# Patient Record
Sex: FEMALE | Race: BLACK OR AFRICAN AMERICAN | NOT HISPANIC OR LATINO | URBAN - NONMETROPOLITAN AREA
[De-identification: names, ages, dates, MRNs, and addresses within clinical notes are randomized per-mention and may not be internally consistent; named-entity substitution may affect disease eponyms.]

---

## 2019-01-02 ENCOUNTER — IMPORTED ENCOUNTER (OUTPATIENT)
Dept: URBAN - NONMETROPOLITAN AREA CLINIC 1 | Facility: CLINIC | Age: 59
End: 2019-01-02

## 2019-01-02 NOTE — PATIENT DISCUSSION
"*Excision of Lid Lesion: LLL-Excision of a lid lesion involves the surgical removal of part or all of a lesion. Risk include infection inflammation bleeding tightness around the suture and the need for more surgery. I have discussed the above procedure possible alternative methods of treatment and risk factors involved with the patient and/or family members. The patient wishes to proceed with excision of lid lesion.    -Consent signed - See MOS NotesCOAG-Appears stable. -Continue drops in plan as directed. Stressed importance of compliance. - Continue Travatan QAM OU- IOP's today were 19 OU  DM s DR-Stressed the importance of keeping blood sugars under control blood pressure under control and weight normalization and regular visits with PCP. -Explained the possible effects of poorly controlled diabetes and the damage that diabetes can cause to ocular health. -Patient to check HgbA1C.-Pt instructed to contact our office with any vision changes. DONTA-Explained DONTA and associated symptoms.-Recommend increasing Omega 3s.-Pt to begin artificial tears OU QID PRN. Pt will contact us if this does not provide relief. Consider punctal plugs in that case. - Continue Systane OU BID/+PRN. Hx Iritis quiet at this time. Continue Lotemax OU QD.""; Dr's Notes: Letter to Dr Charlie Becker. "

## 2019-01-23 PROBLEM — E11.9: Noted: 2019-01-23

## 2019-01-23 PROBLEM — H40.1132: Noted: 2019-01-23

## 2019-01-23 PROBLEM — B07.8: Noted: 2019-01-23

## 2019-01-23 PROBLEM — H04.123: Noted: 2019-01-23

## 2020-03-13 ENCOUNTER — IMPORTED ENCOUNTER (OUTPATIENT)
Dept: URBAN - NONMETROPOLITAN AREA CLINIC 1 | Facility: CLINIC | Age: 60
End: 2020-03-13

## 2020-03-13 PROBLEM — H40.1132: Noted: 2020-03-13

## 2020-03-13 PROBLEM — H04.123: Noted: 2020-03-13

## 2020-03-13 PROBLEM — E11.9: Noted: 2020-03-13

## 2020-03-13 PROCEDURE — 92012 INTRM OPH EXAM EST PATIENT: CPT

## 2020-03-13 NOTE — PATIENT DISCUSSION
COAG -Appears stable. -Continue Travatan Z qam as directed. Stressed importance of compliance. -RTC VF and OCT ON next available. 3 month Ta Check w/ JSDM s DR-Stressed the importance of keeping blood sugars under control blood pressure under control and weight normalization and regular visits with PCP. -Explained the possible effects of poorly controlled diabetes and the damage that diabetes can cause to ocular health. -Patient to check HgbA1C.-Pt instructed to contact our office with any vision changes. hx of Iritis. Quiet today. Continue Lotemax qam OU.; Dr's Notes: Letter to Dr Courtney Lamar.

## 2020-07-07 ENCOUNTER — IMPORTED ENCOUNTER (OUTPATIENT)
Dept: URBAN - NONMETROPOLITAN AREA CLINIC 1 | Facility: CLINIC | Age: 60
End: 2020-07-07

## 2020-07-07 PROCEDURE — 92133 CPTRZD OPH DX IMG PST SGM ON: CPT

## 2020-07-07 PROCEDURE — 92012 INTRM OPH EXAM EST PATIENT: CPT

## 2020-07-07 NOTE — PATIENT DISCUSSION
COAG -iop stable ou-Continue Travatan Z qam as directed. -Stressed importance of compliance. -oct today /stable ouDM s DR-Stressed the importance of keeping blood sugars under control blood pressure under control and weight normalization and regular visits with PCP. -Explained the possible effects of poorly controlled diabetes and the damage that diabetes can cause to ocular health. -Patient to check HgbA1C.-Pt instructed to contact our office with any vision changes. hx of Iritis. Quiet today. Continue Lotemax qam OU.; Dr's Notes: Letter to Dr Aissatou Patrick.

## 2020-11-03 ENCOUNTER — IMPORTED ENCOUNTER (OUTPATIENT)
Dept: URBAN - NONMETROPOLITAN AREA CLINIC 1 | Facility: CLINIC | Age: 60
End: 2020-11-03

## 2020-11-03 PROCEDURE — 92012 INTRM OPH EXAM EST PATIENT: CPT

## 2020-11-03 NOTE — PATIENT DISCUSSION
COAG -iop stable ou-Continue Travatan Z qam as directed. -Stressed importance of compliance. -monitor for iop and vf changesDM s DR-Stressed the importance of keeping blood sugars under control blood pressure under control and weight normalization and regular visits with PCP. -Explained the possible effects of poorly controlled diabetes and the damage that diabetes can cause to ocular health. -Patient to check HgbA1C.-Pt instructed to contact our office with any vision changes. hx of Iritis. Quiet today. Continue Lotemax qam OU.; Dr's Notes: Letter to Dr Alejo Alva.

## 2021-03-02 ENCOUNTER — IMPORTED ENCOUNTER (OUTPATIENT)
Dept: URBAN - NONMETROPOLITAN AREA CLINIC 1 | Facility: CLINIC | Age: 61
End: 2021-03-02

## 2021-03-02 PROCEDURE — 92014 COMPRE OPH EXAM EST PT 1/>: CPT

## 2021-03-02 PROCEDURE — 92020 GONIOSCOPY: CPT

## 2021-03-02 NOTE — PATIENT DISCUSSION
COAG -iop stable ou-Continue Travatan Z qam as directed. -Stressed importance of compliance. -monitor for iop and vf changesDM s DR-Stressed the importance of keeping blood sugars under control blood pressure under control and weight normalization and regular visits with PCP. -Explained the possible effects of poorly controlled diabetes and the damage that diabetes can cause to ocular health. -Patient to check HgbA1C.-Pt instructed to contact our office with any vision changes. hx of Iritis. Quiet today. Continue Lotemax qam OU.

## 2022-04-09 ASSESSMENT — PACHYMETRY
OD_CT_UM: 550; ADJ: THIN
OS_CT_UM: 552; ADJ: THIN
OD_CT_UM: 550; ADJ: THIN
OS_CT_UM: 552; ADJ: THIN
OD_CT_UM: 550; ADJ: THIN
OS_CT_UM: 552; ADJ: THIN
OS_CT_UM: 552; ADJ: THIN
OD_CT_UM: 550; ADJ: THIN
OS_CT_UM: 552; ADJ: THIN
OD_CT_UM: 550; ADJ: THIN

## 2022-04-09 ASSESSMENT — TONOMETRY
OD_IOP_MMHG: 17
OD_IOP_MMHG: 17
OS_IOP_MMHG: 17
OD_IOP_MMHG: 17
OS_IOP_MMHG: 17
OD_IOP_MMHG: 17

## 2022-04-09 ASSESSMENT — VISUAL ACUITY
OD_SC: 20/30
OD_SC: 20/25
OD_SC: 20/30-2
OS_SC: 20/30-1
OD_SC: 20/30
OS_SC: 20/20
OS_SC: 20/30+2
OD_SC: 20/40
OS_SC: 20/50
OS_SC: 20/25

## 2022-04-12 ENCOUNTER — ESTABLISHED PATIENT (OUTPATIENT)
Dept: RURAL CLINIC 1 | Facility: CLINIC | Age: 62
End: 2022-04-12

## 2022-04-12 DIAGNOSIS — E11.9: ICD-10-CM

## 2022-04-12 DIAGNOSIS — H40.1132: ICD-10-CM

## 2022-04-12 PROCEDURE — 92133 CPTRZD OPH DX IMG PST SGM ON: CPT

## 2022-04-12 PROCEDURE — 92014 COMPRE OPH EXAM EST PT 1/>: CPT

## 2022-04-12 ASSESSMENT — TONOMETRY
OD_IOP_MMHG: 18
OS_IOP_MMHG: 18

## 2022-04-12 ASSESSMENT — VISUAL ACUITY
OS_CC: 20/25+1
OD_CC: 20/20

## 2022-10-12 ENCOUNTER — ESTABLISHED PATIENT (OUTPATIENT)
Dept: RURAL CLINIC 1 | Facility: CLINIC | Age: 62
End: 2022-10-12

## 2022-10-12 DIAGNOSIS — H40.1131: ICD-10-CM

## 2022-10-12 DIAGNOSIS — H04.123: ICD-10-CM

## 2022-10-12 DIAGNOSIS — E11.9: ICD-10-CM

## 2022-10-12 PROCEDURE — 92083 EXTENDED VISUAL FIELD XM: CPT

## 2022-10-12 PROCEDURE — 99213 OFFICE O/P EST LOW 20 MIN: CPT

## 2022-10-12 ASSESSMENT — TONOMETRY
OD_IOP_MMHG: 17
OS_IOP_MMHG: 17

## 2022-10-12 ASSESSMENT — VISUAL ACUITY
OS_CC: 20/25-2
OD_CC: 20/20-2

## 2023-04-07 ENCOUNTER — FOLLOW UP (OUTPATIENT)
Dept: RURAL CLINIC 1 | Facility: CLINIC | Age: 63
End: 2023-04-07

## 2023-04-07 DIAGNOSIS — E11.9: ICD-10-CM

## 2023-04-07 DIAGNOSIS — H04.123: ICD-10-CM

## 2023-04-07 DIAGNOSIS — H40.1131: ICD-10-CM

## 2023-04-07 DIAGNOSIS — H25.9: ICD-10-CM

## 2023-04-07 PROCEDURE — 92133 CPTRZD OPH DX IMG PST SGM ON: CPT

## 2023-04-07 PROCEDURE — 92014 COMPRE OPH EXAM EST PT 1/>: CPT

## 2023-04-07 ASSESSMENT — VISUAL ACUITY
OS_CC: 20/25
OD_CC: 20/25

## 2023-04-07 ASSESSMENT — TONOMETRY
OD_IOP_MMHG: 18
OS_IOP_MMHG: 18

## 2024-07-17 ENCOUNTER — COMPREHENSIVE EXAM (OUTPATIENT)
Dept: RURAL CLINIC 1 | Facility: CLINIC | Age: 64
End: 2024-07-17

## 2024-07-17 DIAGNOSIS — H40.1131: ICD-10-CM

## 2024-07-17 DIAGNOSIS — E11.9: ICD-10-CM

## 2024-07-17 PROCEDURE — 92014 COMPRE OPH EXAM EST PT 1/>: CPT

## 2024-07-17 PROCEDURE — 92083 EXTENDED VISUAL FIELD XM: CPT

## 2024-07-17 ASSESSMENT — TONOMETRY
OD_IOP_MMHG: 18
OS_IOP_MMHG: 18

## 2024-07-17 ASSESSMENT — VISUAL ACUITY
OU_CC: 20/25-1
OU_CC: 20/20
OD_CC: 20/25-1
OD_CC: 20/25-1
OS_CC: 20/25-2
OS_CC: 20/25-1

## 2025-02-03 ENCOUNTER — FOLLOW UP (OUTPATIENT)
Age: 65
End: 2025-02-03

## 2025-02-03 DIAGNOSIS — H04.123: ICD-10-CM

## 2025-02-03 DIAGNOSIS — H25.13: ICD-10-CM

## 2025-02-03 DIAGNOSIS — E11.9: ICD-10-CM

## 2025-02-03 DIAGNOSIS — H40.1131: ICD-10-CM

## 2025-02-03 PROCEDURE — 92014 COMPRE OPH EXAM EST PT 1/>: CPT

## 2025-02-03 PROCEDURE — 92133 CPTRZD OPH DX IMG PST SGM ON: CPT
